# Patient Record
(demographics unavailable — no encounter records)

---

## 2024-10-28 NOTE — PLAN
[FreeTextEntry1] : Rx Lysteda  I spent 10 minutes reviewing patient hospital records 25 minutes discussing findings and options for management 5 minutes documenting 40 minutes totatl  follow up 6-8 weeks for re evaluation of pain and bleeding follow up for Annual/Pap Smear  ER warnings given

## 2024-10-28 NOTE — HISTORY OF PRESENT ILLNESS
[FreeTextEntry1] : 49 yo in for follow up after ED visit to [facility]  Patient presented secondary to pelvic pain. Pelvic US and CT Abd/Pelvic notes uterine fibroids  Patient reports menses fairly regular, but heavy x 3 days and very painful  Denies irregular bleeding  Denies history of abnormal pap smears  Last pap   Denies previous  sections    x 4 BTL   Reviewed information from hospital and discussed findings with patient.  Patient given several options   medical:  ocps vs depo proveras vs IUD Mirena and non hormonal Lysteda Procedures:  hysteroscopy d&c with endometrial ablation and fibroid ablation vs myomectomy vs hysterectomy  Patient has considered options and she opts for Lysteda for now She will monitor pain and bleeding  She will return in 6-8 weeks for further discussion and Annual/Pap smear